# Patient Record
Sex: MALE | Race: WHITE | Employment: FULL TIME | URBAN - METROPOLITAN AREA
[De-identification: names, ages, dates, MRNs, and addresses within clinical notes are randomized per-mention and may not be internally consistent; named-entity substitution may affect disease eponyms.]

---

## 2021-02-10 NOTE — H&P
HPI:     Sera Vasquez is a 37year old male who presents as a consult from Dr Camila Smith office ([facility] - LOV 2013) to discuss vasectomy.   Number of children: 3 kids; going through divorce and definitely does not want to have any more kids    He desires perm acute distress  NEUROLOGIC: nonfocal, alert and oriented  HEAD: normocephalic, atraumatic  EYES: sclera non-icteric  EARS: hearing intact  ORAL CAVITY: mucosa moist  NECK/THYROID: no obvious goiter or masses  LUNGS: nonlabored breathing  ABDOMEN: soft, no

## 2021-02-15 ENCOUNTER — OFFICE VISIT (OUTPATIENT)
Dept: SURGERY | Facility: CLINIC | Age: 36
End: 2021-02-15
Payer: COMMERCIAL

## 2021-02-15 VITALS — SYSTOLIC BLOOD PRESSURE: 104 MMHG | HEART RATE: 69 BPM | TEMPERATURE: 98 F | DIASTOLIC BLOOD PRESSURE: 69 MMHG

## 2021-02-15 DIAGNOSIS — Z30.2 ENCOUNTER FOR STERILIZATION: Primary | ICD-10-CM

## 2021-02-15 PROCEDURE — 3078F DIAST BP <80 MM HG: CPT | Performed by: UROLOGY

## 2021-02-15 PROCEDURE — 99243 OFF/OP CNSLTJ NEW/EST LOW 30: CPT | Performed by: UROLOGY

## 2021-02-15 PROCEDURE — 3074F SYST BP LT 130 MM HG: CPT | Performed by: UROLOGY

## 2021-02-15 RX ORDER — DIAZEPAM 10 MG/1
10 TABLET ORAL SEE ADMIN INSTRUCTIONS
Qty: 2 TABLET | Refills: 0 | Status: SHIPPED | OUTPATIENT
Start: 2021-02-15

## 2021-02-15 RX ORDER — TRAMADOL HYDROCHLORIDE 50 MG/1
50 TABLET ORAL EVERY 6 HOURS PRN
Qty: 15 TABLET | Refills: 0 | Status: SHIPPED | OUTPATIENT
Start: 2021-02-15

## 2021-03-12 NOTE — PROGRESS NOTES
HPI:     Grabiel Jaffe is a 37year old male who presents as a consult from Dr Shireen Selby office ([facility] - [address] 2013) for vasectomy.   Number of children: 3 kids; going through divorce and definitely does not want to have any more kids    He presents today for glue. He was advised not to have unprotected intercourse until we have obtained a negative semen analysis per office protocol explained in post-operative instructions.      The patient tolerated the procedure well and left in satisfactory condition without Pertinent positives and negatives noted in the the HPI.     PHYSICAL EXAM:     GENERAL APPEARANCE: well, developed, well nourished, in no acute distress  NEUROLOGIC: nonfocal, alert and oriented  HEAD: normocephalic, atraumatic  EYES: sclera non-icteric  EA

## 2021-03-18 ENCOUNTER — TELEPHONE (OUTPATIENT)
Dept: SURGERY | Facility: CLINIC | Age: 36
End: 2021-03-18

## 2021-03-18 NOTE — TELEPHONE ENCOUNTER
Patient is scheduled for in office vasectomy CPT [phone] on Friday, March 19th. Per Rob/COLTEN [phone] CPT 62529 rendered in office does not require prior authorization. CPT 54452 is a valid and billable code, covered 100%, no copay, no deductible.   R

## 2021-03-19 ENCOUNTER — PROCEDURE (OUTPATIENT)
Dept: SURGERY | Facility: CLINIC | Age: 36
End: 2021-03-19
Payer: COMMERCIAL

## 2021-03-19 VITALS — SYSTOLIC BLOOD PRESSURE: 120 MMHG | TEMPERATURE: 97 F | HEART RATE: 64 BPM | DIASTOLIC BLOOD PRESSURE: 73 MMHG

## 2021-03-19 DIAGNOSIS — Z30.2 ENCOUNTER FOR STERILIZATION: Primary | ICD-10-CM

## 2021-03-19 PROCEDURE — 3074F SYST BP LT 130 MM HG: CPT | Performed by: UROLOGY

## 2021-03-19 PROCEDURE — 55250 REMOVAL OF SPERM DUCT(S): CPT | Performed by: UROLOGY

## 2021-03-19 PROCEDURE — 3078F DIAST BP <80 MM HG: CPT | Performed by: UROLOGY

## 2021-07-14 ENCOUNTER — LAB ENCOUNTER (OUTPATIENT)
Dept: LAB | Age: 36
End: 2021-07-14
Attending: UROLOGY
Payer: COMMERCIAL

## 2021-07-14 DIAGNOSIS — Z30.2 ENCOUNTER FOR STERILIZATION: ICD-10-CM

## 2021-07-14 PROCEDURE — 89310 SEMEN ANALYSIS W/COUNT: CPT

## 2021-07-21 ENCOUNTER — TELEPHONE (OUTPATIENT)
Dept: SURGERY | Facility: CLINIC | Age: 36
End: 2021-07-21

## 2021-07-21 NOTE — TELEPHONE ENCOUNTER
I called the pt and gave him message from [facility]:        Pt verbalized understanding and all questions were answered.

## 2021-11-09 ENCOUNTER — NURSE ONLY (OUTPATIENT)
Dept: INTERNAL MEDICINE CLINIC | Facility: HOSPITAL | Age: 36
End: 2021-11-09
Attending: EMERGENCY MEDICINE

## 2021-11-09 DIAGNOSIS — Z00.00 WELLNESS EXAMINATION: Primary | ICD-10-CM

## 2021-11-09 PROCEDURE — 86787 VARICELLA-ZOSTER ANTIBODY: CPT

## 2021-11-09 PROCEDURE — 86480 TB TEST CELL IMMUN MEASURE: CPT

## 2021-12-06 ENCOUNTER — TELEPHONE (OUTPATIENT)
Dept: INTERNAL MEDICINE CLINIC | Facility: HOSPITAL | Age: 36
End: 2021-12-06

## 2021-12-06 DIAGNOSIS — Z00.00 ROUTINE GENERAL MEDICAL EXAMINATION AT A HEALTH CARE FACILITY: Primary | ICD-10-CM

## 2022-10-11 ENCOUNTER — IMMUNIZATION (OUTPATIENT)
Dept: LAB | Facility: HOSPITAL | Age: 37
End: 2022-10-11
Attending: PREVENTIVE MEDICINE
Payer: COMMERCIAL

## 2022-10-11 DIAGNOSIS — Z23 NEED FOR VACCINATION: Primary | ICD-10-CM

## 2022-10-11 PROCEDURE — 90471 IMMUNIZATION ADMIN: CPT

## 2024-01-11 NOTE — PROGRESS NOTES
Moshe Burns,  I have reviewed your test results. Your semen analysis shows no sperm. You may now resume intercourse without the use of contraception. Please let me know if you have any questions or concerns. Thanks and take care!     Kulwant Mclean MD
fever at home

## 2024-02-10 ENCOUNTER — HOSPITAL ENCOUNTER (OUTPATIENT)
Facility: HOSPITAL | Age: 39
Setting detail: OBSERVATION
Discharge: HOME | End: 2024-02-13
Attending: EMERGENCY MEDICINE | Admitting: HOSPITALIST
Payer: COMMERCIAL

## 2024-02-10 DIAGNOSIS — R19.7 NAUSEA VOMITING AND DIARRHEA: Primary | ICD-10-CM

## 2024-02-10 DIAGNOSIS — K85.90 ACUTE PANCREATITIS, UNSPECIFIED COMPLICATION STATUS, UNSPECIFIED PANCREATITIS TYPE: ICD-10-CM

## 2024-02-10 DIAGNOSIS — R10.13 EPIGASTRIC PAIN: ICD-10-CM

## 2024-02-10 DIAGNOSIS — D72.829 LEUKOCYTOSIS, UNSPECIFIED TYPE: ICD-10-CM

## 2024-02-10 DIAGNOSIS — R11.2 NAUSEA VOMITING AND DIARRHEA: Primary | ICD-10-CM

## 2024-02-10 DIAGNOSIS — R73.9 HYPERGLYCEMIA: ICD-10-CM

## 2024-02-10 LAB
ALBUMIN SERPL-MCNC: 3.7 G/DL (ref 3.4–5)
ALBUMIN/GLOB SERPL: 1.2 {RATIO} (ref 1–2)
ALP LIVER SERPL-CCNC: 88 U/L
ALT SERPL-CCNC: 29 U/L
ANION GAP SERPL CALC-SCNC: 3 MMOL/L (ref 0–18)
AST SERPL-CCNC: 26 U/L (ref 15–37)
BASOPHILS # BLD AUTO: 0.04 X10(3) UL (ref 0–0.2)
BASOPHILS NFR BLD AUTO: 0.2 %
BILIRUB SERPL-MCNC: 0.5 MG/DL (ref 0.1–2)
BUN BLD-MCNC: 13 MG/DL (ref 9–23)
CALCIUM BLD-MCNC: 8.5 MG/DL (ref 8.5–10.1)
CHLORIDE SERPL-SCNC: 108 MMOL/L (ref 98–112)
CO2 SERPL-SCNC: 29 MMOL/L (ref 21–32)
CREAT BLD-MCNC: 1.13 MG/DL
EGFRCR SERPLBLD CKD-EPI 2021: 86 ML/MIN/1.73M2 (ref 60–?)
EOSINOPHIL # BLD AUTO: 0.2 X10(3) UL (ref 0–0.7)
EOSINOPHIL NFR BLD AUTO: 1.1 %
ERYTHROCYTE [DISTWIDTH] IN BLOOD BY AUTOMATED COUNT: 12.6 %
GLOBULIN PLAS-MCNC: 3.1 G/DL (ref 2.8–4.4)
GLUCOSE BLD-MCNC: 101 MG/DL (ref 70–99)
HCT VFR BLD AUTO: 44.9 %
HGB BLD-MCNC: 15 G/DL
IMM GRANULOCYTES # BLD AUTO: 0.07 X10(3) UL (ref 0–1)
IMM GRANULOCYTES NFR BLD: 0.4 %
LIPASE SERPL-CCNC: 615 U/L (ref 13–75)
LYMPHOCYTES # BLD AUTO: 1.65 X10(3) UL (ref 1–4)
LYMPHOCYTES NFR BLD AUTO: 9 %
MCH RBC QN AUTO: 32.2 PG (ref 26–34)
MCHC RBC AUTO-ENTMCNC: 33.4 G/DL (ref 31–37)
MCV RBC AUTO: 96.4 FL
MONOCYTES # BLD AUTO: 1.42 X10(3) UL (ref 0.1–1)
MONOCYTES NFR BLD AUTO: 7.8 %
NEUTROPHILS # BLD AUTO: 14.89 X10 (3) UL (ref 1.5–7.7)
NEUTROPHILS # BLD AUTO: 14.89 X10(3) UL (ref 1.5–7.7)
NEUTROPHILS NFR BLD AUTO: 81.5 %
OSMOLALITY SERPL CALC.SUM OF ELEC: 290 MOSM/KG (ref 275–295)
PLATELET # BLD AUTO: 304 10(3)UL (ref 150–450)
POTASSIUM SERPL-SCNC: 3.9 MMOL/L (ref 3.5–5.1)
PROT SERPL-MCNC: 6.8 G/DL (ref 6.4–8.2)
RBC # BLD AUTO: 4.66 X10(6)UL
SODIUM SERPL-SCNC: 140 MMOL/L (ref 136–145)
WBC # BLD AUTO: 18.3 X10(3) UL (ref 4–11)

## 2024-02-10 PROCEDURE — 99285 EMERGENCY DEPT VISIT HI MDM: CPT

## 2024-02-10 PROCEDURE — 81001 URINALYSIS AUTO W/SCOPE: CPT | Performed by: EMERGENCY MEDICINE

## 2024-02-10 PROCEDURE — 80053 COMPREHEN METABOLIC PANEL: CPT | Performed by: EMERGENCY MEDICINE

## 2024-02-10 PROCEDURE — 96375 TX/PRO/DX INJ NEW DRUG ADDON: CPT

## 2024-02-10 PROCEDURE — 85025 COMPLETE CBC W/AUTO DIFF WBC: CPT | Performed by: EMERGENCY MEDICINE

## 2024-02-10 PROCEDURE — 96361 HYDRATE IV INFUSION ADD-ON: CPT

## 2024-02-10 PROCEDURE — S0028 INJECTION, FAMOTIDINE, 20 MG: HCPCS | Performed by: EMERGENCY MEDICINE

## 2024-02-10 PROCEDURE — 96374 THER/PROPH/DIAG INJ IV PUSH: CPT

## 2024-02-10 PROCEDURE — 83690 ASSAY OF LIPASE: CPT | Performed by: EMERGENCY MEDICINE

## 2024-02-10 PROCEDURE — 81015 MICROSCOPIC EXAM OF URINE: CPT | Performed by: EMERGENCY MEDICINE

## 2024-02-10 RX ORDER — DIPHENHYDRAMINE HYDROCHLORIDE 50 MG/ML
25 INJECTION INTRAMUSCULAR; INTRAVENOUS ONCE
Status: COMPLETED | OUTPATIENT
Start: 2024-02-10 | End: 2024-02-10

## 2024-02-10 RX ORDER — ONDANSETRON 4 MG/1
4 TABLET, ORALLY DISINTEGRATING ORAL EVERY 8 HOURS PRN
Qty: 10 TABLET | Refills: 0 | Status: SHIPPED | OUTPATIENT
Start: 2024-02-10 | End: 2024-02-20

## 2024-02-10 RX ORDER — LOPERAMIDE HYDROCHLORIDE 2 MG/1
2 TABLET ORAL AS NEEDED
Qty: 20 TABLET | Refills: 0 | Status: SHIPPED | OUTPATIENT
Start: 2024-02-10 | End: 2024-02-13

## 2024-02-10 RX ORDER — SODIUM CHLORIDE 9 MG/ML
125 INJECTION, SOLUTION INTRAVENOUS CONTINUOUS
Status: DISCONTINUED | OUTPATIENT
Start: 2024-02-10 | End: 2024-02-13

## 2024-02-10 RX ORDER — ONDANSETRON 2 MG/ML
4 INJECTION INTRAMUSCULAR; INTRAVENOUS ONCE
Status: DISCONTINUED | OUTPATIENT
Start: 2024-02-10 | End: 2024-02-10

## 2024-02-10 RX ORDER — FAMOTIDINE 10 MG/ML
20 INJECTION, SOLUTION INTRAVENOUS ONCE
Status: COMPLETED | OUTPATIENT
Start: 2024-02-10 | End: 2024-02-10

## 2024-02-10 RX ORDER — METOCLOPRAMIDE HYDROCHLORIDE 5 MG/ML
10 INJECTION INTRAMUSCULAR; INTRAVENOUS ONCE
Status: COMPLETED | OUTPATIENT
Start: 2024-02-10 | End: 2024-02-10

## 2024-02-11 ENCOUNTER — APPOINTMENT (OUTPATIENT)
Dept: CT IMAGING | Age: 39
End: 2024-02-11
Attending: EMERGENCY MEDICINE
Payer: COMMERCIAL

## 2024-02-11 PROBLEM — K85.90 ACUTE PANCREATITIS, UNSPECIFIED COMPLICATION STATUS, UNSPECIFIED PANCREATITIS TYPE: Status: ACTIVE | Noted: 2024-02-11

## 2024-02-11 PROBLEM — R19.7 NAUSEA VOMITING AND DIARRHEA: Status: ACTIVE | Noted: 2024-02-11

## 2024-02-11 PROBLEM — R10.13 EPIGASTRIC PAIN: Status: ACTIVE | Noted: 2024-02-11

## 2024-02-11 PROBLEM — R73.9 HYPERGLYCEMIA: Status: ACTIVE | Noted: 2024-02-11

## 2024-02-11 PROBLEM — R11.2 NAUSEA VOMITING AND DIARRHEA: Status: ACTIVE | Noted: 2024-02-11

## 2024-02-11 PROBLEM — D72.829 LEUKOCYTOSIS, UNSPECIFIED TYPE: Status: ACTIVE | Noted: 2024-02-11

## 2024-02-11 LAB
ALBUMIN SERPL-MCNC: 3.4 G/DL (ref 3.4–5)
ALBUMIN/GLOB SERPL: 1.2 {RATIO} (ref 1–2)
ALP LIVER SERPL-CCNC: 70 U/L
ALT SERPL-CCNC: 21 U/L
ANION GAP SERPL CALC-SCNC: 3 MMOL/L (ref 0–18)
AST SERPL-CCNC: 22 U/L (ref 15–37)
BASOPHILS # BLD AUTO: 0.05 X10(3) UL (ref 0–0.2)
BASOPHILS NFR BLD AUTO: 0.4 %
BILIRUB SERPL-MCNC: 0.6 MG/DL (ref 0.1–2)
BILIRUB UR QL STRIP.AUTO: NEGATIVE
BUN BLD-MCNC: 11 MG/DL (ref 9–23)
C DIFF TOX B STL QL: NEGATIVE
CALCIUM BLD-MCNC: 8.8 MG/DL (ref 8.5–10.1)
CHLORIDE SERPL-SCNC: 111 MMOL/L (ref 98–112)
CHOLEST SERPL-MCNC: 132 MG/DL (ref ?–200)
CLARITY UR REFRACT.AUTO: CLEAR
CO2 SERPL-SCNC: 27 MMOL/L (ref 21–32)
COLOR UR AUTO: YELLOW
CREAT BLD-MCNC: 1.18 MG/DL
EGFRCR SERPLBLD CKD-EPI 2021: 82 ML/MIN/1.73M2 (ref 60–?)
EOSINOPHIL # BLD AUTO: 0.33 X10(3) UL (ref 0–0.7)
EOSINOPHIL NFR BLD AUTO: 2.5 %
ERYTHROCYTE [DISTWIDTH] IN BLOOD BY AUTOMATED COUNT: 12.5 %
GLOBULIN PLAS-MCNC: 2.9 G/DL (ref 2.8–4.4)
GLUCOSE BLD-MCNC: 89 MG/DL (ref 70–99)
GLUCOSE UR STRIP.AUTO-MCNC: NEGATIVE MG/DL
HCT VFR BLD AUTO: 46.1 %
HDLC SERPL-MCNC: 36 MG/DL (ref 40–59)
HGB BLD-MCNC: 15.3 G/DL
IMM GRANULOCYTES # BLD AUTO: 0.06 X10(3) UL (ref 0–1)
IMM GRANULOCYTES NFR BLD: 0.5 %
KETONES UR STRIP.AUTO-MCNC: NEGATIVE MG/DL
LDLC SERPL CALC-MCNC: 79 MG/DL (ref ?–100)
LEUKOCYTE ESTERASE UR QL STRIP.AUTO: NEGATIVE
LYMPHOCYTES # BLD AUTO: 1.91 X10(3) UL (ref 1–4)
LYMPHOCYTES NFR BLD AUTO: 14.7 %
MCH RBC QN AUTO: 32.3 PG (ref 26–34)
MCHC RBC AUTO-ENTMCNC: 33.2 G/DL (ref 31–37)
MCV RBC AUTO: 97.3 FL
MONOCYTES # BLD AUTO: 1.28 X10(3) UL (ref 0.1–1)
MONOCYTES NFR BLD AUTO: 9.9 %
NEUTROPHILS # BLD AUTO: 9.35 X10 (3) UL (ref 1.5–7.7)
NEUTROPHILS # BLD AUTO: 9.35 X10(3) UL (ref 1.5–7.7)
NEUTROPHILS NFR BLD AUTO: 72 %
NITRITE UR QL STRIP.AUTO: NEGATIVE
NONHDLC SERPL-MCNC: 96 MG/DL (ref ?–130)
OSMOLALITY SERPL CALC.SUM OF ELEC: 291 MOSM/KG (ref 275–295)
PH UR STRIP.AUTO: 5.5 [PH] (ref 5–8)
PLATELET # BLD AUTO: 270 10(3)UL (ref 150–450)
POTASSIUM SERPL-SCNC: 3.7 MMOL/L (ref 3.5–5.1)
PROT SERPL-MCNC: 6.3 G/DL (ref 6.4–8.2)
PROT UR STRIP.AUTO-MCNC: NEGATIVE MG/DL
RBC # BLD AUTO: 4.74 X10(6)UL
SODIUM SERPL-SCNC: 141 MMOL/L (ref 136–145)
SP GR UR STRIP.AUTO: >=1.03 (ref 1–1.03)
TRIGL SERPL-MCNC: 91 MG/DL (ref 30–149)
UROBILINOGEN UR STRIP.AUTO-MCNC: 0.2 MG/DL
VLDLC SERPL CALC-MCNC: 14 MG/DL (ref 0–30)
WBC # BLD AUTO: 13 X10(3) UL (ref 4–11)

## 2024-02-11 PROCEDURE — 85025 COMPLETE CBC W/AUTO DIFF WBC: CPT | Performed by: HOSPITALIST

## 2024-02-11 PROCEDURE — 87493 C DIFF AMPLIFIED PROBE: CPT | Performed by: HOSPITALIST

## 2024-02-11 PROCEDURE — 87507 IADNA-DNA/RNA PROBE TQ 12-25: CPT | Performed by: HOSPITALIST

## 2024-02-11 PROCEDURE — 80053 COMPREHEN METABOLIC PANEL: CPT | Performed by: HOSPITALIST

## 2024-02-11 PROCEDURE — 74177 CT ABD & PELVIS W/CONTRAST: CPT | Performed by: EMERGENCY MEDICINE

## 2024-02-11 PROCEDURE — 80061 LIPID PANEL: CPT | Performed by: HOSPITALIST

## 2024-02-11 RX ORDER — ONDANSETRON 2 MG/ML
4 INJECTION INTRAMUSCULAR; INTRAVENOUS EVERY 6 HOURS PRN
Status: DISCONTINUED | OUTPATIENT
Start: 2024-02-11 | End: 2024-02-13

## 2024-02-11 RX ORDER — SODIUM CHLORIDE, SODIUM LACTATE, POTASSIUM CHLORIDE, CALCIUM CHLORIDE 600; 310; 30; 20 MG/100ML; MG/100ML; MG/100ML; MG/100ML
INJECTION, SOLUTION INTRAVENOUS CONTINUOUS
Status: DISCONTINUED | OUTPATIENT
Start: 2024-02-11 | End: 2024-02-13

## 2024-02-11 RX ORDER — MORPHINE SULFATE 2 MG/ML
1 INJECTION, SOLUTION INTRAMUSCULAR; INTRAVENOUS EVERY 2 HOUR PRN
Status: DISCONTINUED | OUTPATIENT
Start: 2024-02-11 | End: 2024-02-13

## 2024-02-11 RX ORDER — MORPHINE SULFATE 4 MG/ML
4 INJECTION, SOLUTION INTRAMUSCULAR; INTRAVENOUS EVERY 2 HOUR PRN
Status: DISCONTINUED | OUTPATIENT
Start: 2024-02-11 | End: 2024-02-13

## 2024-02-11 RX ORDER — MORPHINE SULFATE 2 MG/ML
2 INJECTION, SOLUTION INTRAMUSCULAR; INTRAVENOUS EVERY 2 HOUR PRN
Status: DISCONTINUED | OUTPATIENT
Start: 2024-02-11 | End: 2024-02-13

## 2024-02-11 RX ORDER — ENOXAPARIN SODIUM 100 MG/ML
40 INJECTION SUBCUTANEOUS DAILY
Status: DISCONTINUED | OUTPATIENT
Start: 2024-02-11 | End: 2024-02-13

## 2024-02-11 RX ORDER — PROCHLORPERAZINE EDISYLATE 5 MG/ML
5 INJECTION INTRAMUSCULAR; INTRAVENOUS EVERY 8 HOURS PRN
Status: DISCONTINUED | OUTPATIENT
Start: 2024-02-11 | End: 2024-02-13

## 2024-02-11 NOTE — ED QUICK NOTES
Orders for admission, patient is aware of plan and ready to go upstairs. Any questions, please call ED RN Nel at extension [phone].     Patient Covid vaccination status: Fully vaccinated     COVID Test Ordered in ED: None    COVID Suspicion at Admission: N/A    Running Infusions:    sodium chloride Stopped (02/11/24 0148)    None    Mental Status/LOC at time of transport: A&OX3    Other pertinent information: Patient arriving by private car, ok per MD.  CIWA score: N/A   NIH score:  N/A

## 2024-02-11 NOTE — H&P
[facility] Hospitalist H&P       CC:   Chief Complaint   Patient presents with    Nausea/Vomiting/Diarrhea        PCP: Jarod Lo MD    History of Present Illness:    Patient is a relatively healthy 38-year-old male with no significant past medical history basically presented with abdominal knee pain x 1 week.  Patient states that for about 1 week he had midepigastric pain and he was doubled over with cramping as well as profuse diarrhea at home.  He also had some nausea especially with food but no vomiting.  No chest pains no shortness of breath review of system otherwise is complete negative.  Currently patient has a stomach ache but has no nausea no vomiting eager to eat something does not drink any alcohol.  Patient states that he probably had 1 drink in 6 months-no other symptoms whatsoever       in the emergency room his vitals were stable labs remarkable for lipase in the 600s, rest the workup was remarkable for leukocytosis of 18.3 UA was negative for C. difficile is negative        Patient was kept n.p.o. and admitted for acute pancreatitis CT abdomen pelvis was done that showed prominent fluid-filled small bowel loops with mild wall thickening concerning for some enteritis  PMH  History reviewed. No pertinent past medical history.     PSH  Past Surgical History:   Procedure Laterality Date    OTHER SURGICAL HISTORY  12 y/o    nasal fracture repair        ALL:  Allergies   Allergen Reactions    Sulfa Antibiotics RASH        Home Medications:  Outpatient Medications Marked as Taking for the 2/10/24 encounter (Hospital Encounter)   Medication Sig Dispense Refill    ondansetron 4 MG Oral Tablet Dispersible Take 1 tablet (4 mg total) by mouth every 8 (eight) hours as needed for Nausea (vomiting). 10 tablet 0    Loperamide HCl 2 MG Oral Tab Take 1 tablet (2 mg total) by mouth as needed for Diarrhea. Max of 8mg in 24 hours 20 tablet 0         Soc Hx  Social History     Tobacco Use    Smoking status: Never     Smokeless tobacco: Never   Substance Use Topics    Alcohol use: Yes     Comment: socially        Fam Hx  Family History   Problem Relation Age of Onset    Cancer Maternal Grandmother 69        colon    Heart Attack Maternal Grandfather        Review of Systems  General: Denies unintentional weight loss, fevers, or chills negative except for above  HEENT: Denies vision loss or double vision, denies hearing loss  Cardiovascular: Denies chest pain, palpitations, peripheral edema  Pulmonary: Denies cough, shortness of breath, or wheezing  Gastrointestinal: Denies abdominal pain, melena, or hematochezia  Genitourinary: Denies urinary frequency, urgency, and dysuria  Neurologic: Denies numbness, headaches, focal weakness  Skin: Denies rashes, sores  Endocrine: Denies heat or cold intolerance, denies polydipsia  Hematologic: Denies abnormal bleeding or bruising     OBJECTIVE:  /65 (BP Location: Left arm)   Pulse 75   Temp 98.3 °F (36.8 °C) (Oral)   Resp 16   Ht 6' 6\" (1.981 m)   Wt 259 lb (117.5 kg)   SpO2 98%   BMI 29.93 kg/m²     BP Readings from Last 3 Encounters:   02/11/24 108/65   03/19/21 120/73   02/15/21 104/69     Wt Readings from Last 3 Encounters:   02/11/24 259 lb (117.5 kg)   09/13/13 250 lb (113.4 kg)   08/29/13 247 lb (112 kg)       Wt Readings from Last 6 Encounters:   02/11/24 259 lb (117.5 kg)   09/13/13 250 lb (113.4 kg)   08/29/13 247 lb (112 kg)   08/15/13 253 lb (114.8 kg)   08/09/13 248 lb (112.5 kg)   08/02/13 248 lb (112.5 kg)     Gen: No acute distress, alert and oriented x 3  Pulm: Lungs clear bilaterally, good inspiratory effort   CV:  nL S1/S2  Abd: soft, NT/ND, no hepatomegaly, +BS mild tenderness in the midepigastric region-  MSK: moving all extremities, no edema  Neuro: no focal deficits  Skin: no rashes/lesions  Psych: normal mood/affect          Diagnostic Data:    CBC/Chem  Recent Labs   Lab 02/10/24  2257 02/11/24  1108   WBC 18.3* 13.0*   HGB 15.0 15.3   MCV 96.4 97.3    .0 270.0       Recent Labs   Lab 02/10/24  2257 02/11/24  1108    141   K 3.9 3.7    111   CO2 29.0 27.0   BUN 13 11   CREATSERUM 1.13 1.18   * 89   CA 8.5 8.8       Recent Labs   Lab 02/10/24  2257 02/11/24  1108   ALT 29 21   AST 26 22   ALB 3.7 3.4       No results for input(s): \"TROP\" in the last 168 hours.        Radiology: CT ABDOMEN+PELVIS(CONTRAST ONLY)(CPT=74177)    Result Date: 2/11/2024  PROCEDURE:  CT ABDOMEN+PELVIS (CONTRAST ONLY) (CPT=74177)  COMPARISON:  None.  INDICATIONS:  nausea and vomiting for approximately a week.  denies fever  TECHNIQUE:  CT scanning was performed from the dome of the diaphragm to the pubic symphysis with non-ionic intravenous contrast material. Post contrast coronal MPR imaging was performed.  Dose reduction techniques were used. Dose information is transmitted to the ACR (American College of Radiology) NRDR (National Radiology Data Registry) which includes the Dose Index Registry.  PATIENT STATED HISTORY:(As transcribed by Technologist)  nausea and vomiting for approximately a week.  denies fever   CONTRAST USED:  100cc of Isovue 370  FINDINGS:  LUNG BASE:  Clear. LIVER:  Homogeneous enhancement. BILIARY:  No biliary ductal dilatation. PANCREAS:  Homogeneous enhancement. SPLEEN:  Normal caliber. KIDNEYS:  No hydronephrosis or focal renal mass. ADRENALS:  Normal. AORTA/VASCULAR:  No aneurysm. RETROPERITONEUM:  No enlarged adenopathy. BOWEL/MESENTERY:  There is a large amount of stool throughout the colon consistent with constipation.  There are fluid-filled small bowel loops demonstrating mild wall thickening throughout the abdomen and anterior pelvis.  No evidence of mechanical small bowel obstruction.  Findings nonspecific although may be seen with enteritis. ABDOMINAL WALL:  Small fat containing umbilical hernia.. PELVIC ORGANS:  Normal for age. 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  Mild degenerative changes in the lower lumbar facets.              CONCLUSION:  There is prominent fluid-filled small bowel loops with mild wall thickening in the left upper quadrant, central abdomen and anterior pelvis.  This is nonspecific although may be seen with enteritis.  No evidence of mechanical small bowel obstruction.  Large amount of stool throughout the colon consistent with constipation.   LOCATION:  [id number]   Dictated by (CST): Mirna Reed MD on 2/11/2024 at 7:54 AM     Finalized by (CST): Mirna Reed MD on 2/11/2024 at 7:57 AM              ASSESSMENT / PLAN:       Patient is a relatively healthy 38-year-old male with no significant past medical history basically presented with abdominal pain x 1 week.      # Acute pancreatitis unclear cause  -Elevated lipase, midepigastric pain with nausea vomiting  -CT abdomen pelvis without any evidence of pancreatitis however small fluid-filled bowels  -Getting a GI stool panel, LFTs are within normal limits, will get an right  upper quadrant ultrasound  -Lipid panel to rule out triglyceride induced pancreatitis  -Continue to monitor CBC, leukocytosis been trending down  -Clear liquid diet  -IV fluids, pain control nausea control    Prophy:  DVT: Lovenox  Deconditioning prevention: PT OT    Dispo: admit to [facility] with telemetry    Outpatient records reviewed confirming patient's medical history and medications.     Further recommendations pending patient's clinical course.  DM hospitalist to continue to follow patient while in house    Time spent: greater than 95 minutes spent in d/w pt/family, coordination of care, and d/w staff.     Pepito ac MD   Internal Medicine  [facility] Hospitalist  Pager: [phone]

## 2024-02-11 NOTE — ED PROVIDER NOTES
Patient Seen in: [facility] [place]      History     Chief Complaint   Patient presents with    Nausea/Vomiting/Diarrhea     Stated Complaint: nausea and vomiting for approximately a week.  denies fever    Subjective:   Patient is 38-year-old male presents emergency room for evaluation of epigastric pain.  Patient reports for the last week he has had nausea with forced vomiting no fevers.  Patient reports no tobacco use or alcohol use.  Patient reports he went to urgent care was given prescription for Zofran and Bentyl with no improvement.  Patient has no tenderness of patient on exam.    The history is provided by the patient.           Objective:   History reviewed. No pertinent past medical history.           Past Surgical History:   Procedure Laterality Date    OTHER SURGICAL HISTORY  14 y/o    nasal fracture repair                Social History     Socioeconomic History    Marital status: Single   Tobacco Use    Smoking status: Never    Smokeless tobacco: Never   Vaping Use    Vaping Use: Never used   Substance and Sexual Activity    Alcohol use: Yes     Comment: socially    Drug use: No              Review of Systems   Constitutional:  Negative for fever.   Gastrointestinal:  Positive for abdominal pain, nausea and vomiting.       Positive for stated complaint: nausea and vomiting for approximately a week.  denies fever  Other systems are as noted in HPI.  Constitutional and vital signs reviewed.      All other systems reviewed and negative except as noted above.    Physical Exam     ED Triage Vitals [02/10/24 2243]   /85   Pulse 89   Resp 16   Temp 98.3 °F (36.8 °C)   Temp src Temporal   SpO2 100 %   O2 Device None (Room air)       Current:/78   Pulse 83   Temp 98.2 °F (36.8 °C) (Oral)   Resp 15   Ht 198.1 cm (6' 6\")   Wt 113.4 kg   SpO2 98%   BMI 28.89 kg/m²         Physical Exam  Vitals and nursing note reviewed.   Constitutional:       General: He is not in acute  distress.     Appearance: He is well-developed and normal weight. He is not toxic-appearing.   HENT:      Head: Normocephalic and atraumatic.   Eyes:      Extraocular Movements: Extraocular movements intact.      Pupils: Pupils are equal, round, and reactive to light.   Cardiovascular:      Rate and Rhythm: Normal rate and regular rhythm.      Heart sounds: Normal heart sounds.   Pulmonary:      Effort: Pulmonary effort is normal.      Breath sounds: Normal breath sounds.   Abdominal:      General: Bowel sounds are normal. There is no distension.      Palpations: Abdomen is soft.      Tenderness: There is no abdominal tenderness. There is no guarding or rebound.   Skin:     General: Skin is warm.      Capillary Refill: Capillary refill takes less than 2 seconds.   Neurological:      General: No focal deficit present.      Mental Status: He is alert and oriented to person, place, and time.   Psychiatric:         Mood and Affect: Mood normal.         Behavior: Behavior normal.               ED Course     Labs Reviewed   COMP METABOLIC PANEL (14) - Abnormal; Notable for the following components:       Result Value    Glucose 101 (*)     All other components within normal limits   LIPASE - Abnormal; Notable for the following components:    Lipase 615 (*)     All other components within normal limits   CBC W/ DIFFERENTIAL - Abnormal; Notable for the following components:    WBC 18.3 (*)     Neutrophil Absolute Prelim 14.89 (*)     Neutrophil Absolute 14.89 (*)     Monocyte Absolute 1.42 (*)     All other components within normal limits   CBC WITH DIFFERENTIAL WITH PLATELET    Narrative:     The following orders were created for panel order CBC With Differential With Platelet.  Procedure                               Abnormality         Status                     ---------                               -----------         ------                     CBC W/ DIFFERENTIAL[[id number]]          Abnormal            Final result                  Please view results for these tests on the individual orders.   URINALYSIS, ROUTINE             CT scan shows no priors the stomach is contracted and there is a few prominent fluid-filled segments of small bowel in the abdomen and pelvis without wall thickening or discrete transition point to suggest mechanical obstruction.  This is a nonspecific pattern which may represent mild enteritis.  The liver gallbladder spleen and pancreas adrenal glands and kidneys are normal.  The appendix is surgically absent.  The colon is stool-filled and prominent suggesting constipation.  The colon is wall thickening.  Bladder and prostate appear normal.  No ascites unremarkable lung bases.  Chronic pars defect of L5 on the left.         MDM      Social -negative tobacco, negative etoh, negative drugs  Family History-noncontributory  Past Medical History-no significant past medical history of    Differential diagnosis before testing included gastritis, pancreatitis, bowel obstruction    Co-morbidities that add to the complexity of management include: None    Testing ordered during this visit included in labs, CT scan of the abdomen    Radiographic images  I personally reviewed the radiographs and my individual interpretation shows no acute process  I also reviewed the official reports that showed No results found.      External chart review showed review of care everywhere in epic system shows no related comorbidities to current presentation    History obtained by an independent source included from patient    Discussion of management with patient, hospitalist    Social determinants of health that affect care include not applicable      Medications Provided: Zofran, Pepcid, IV fluids    Course of Events during Emergency Room Visit include 38-year-old male who presents emergency room with 1 week of epigastric pain.  Patient reports he has been causing himself to vomit to help with symptoms however does not relieve his  pain.  Patient reports no tobacco or alcohol use.  He is found to have an elevated lipase level keep n.p.o. IV fluids and plan for admission awaiting CT results and will speak with the duly hospitalist.          Disposition:    Admission  I have discussed with the patient the results of test, differential diagnosis, and treatment plan. They expressed clear understanding of these instructions and agrees to the plan provided.                                      Medical Decision Making      Disposition and Plan     Clinical Impression:  1. Nausea vomiting and diarrhea    2. Epigastric pain    3. Acute pancreatitis, unspecified complication status, unspecified pancreatitis type         Disposition:  There is no disposition on file for this visit.  There is no disposition time on file for this visit.    Follow-up:  Jarod Lo MD  [address] [place] [address] [phone]    Schedule an appointment as soon as possible for a visit            Medications Prescribed:  Current Discharge Medication List        START taking these medications    Details   ondansetron 4 MG Oral Tablet Dispersible Take 1 tablet (4 mg total) by mouth every 8 (eight) hours as needed for Nausea (vomiting).  Qty: 10 tablet, Refills: 0      Loperamide HCl 2 MG Oral Tab Take 1 tablet (2 mg total) by mouth as needed for Diarrhea. Max of 8mg in 24 hours  Qty: 20 tablet, Refills: 0

## 2024-02-11 NOTE — PLAN OF CARE
Alert and oriented x 4. Vitals stable on room air. Denies pain or nausea at this time. Voiding without difficulty. Last BM 02/11, abdomen soft, passing gas, bowel sounds present. On clear liquid diet. IV fluids per orders.

## 2024-02-11 NOTE — ED QUICK NOTES
DEVEN Coates aware of patient transport via private car and the need to resume IVF once patient arrives. IVF stopped as ordered per MD and IV wrapped/secured for transport. Patient remains alert and oriented,up with steady gait.

## 2024-02-12 ENCOUNTER — APPOINTMENT (OUTPATIENT)
Dept: ULTRASOUND IMAGING | Facility: HOSPITAL | Age: 39
End: 2024-02-12
Attending: HOSPITALIST
Payer: COMMERCIAL

## 2024-02-12 LAB
ADENOVIRUS F 40/41 PCR: NEGATIVE
ALBUMIN SERPL-MCNC: 3.4 G/DL (ref 3.4–5)
ALBUMIN/GLOB SERPL: 1.2 {RATIO} (ref 1–2)
ALP LIVER SERPL-CCNC: 66 U/L
ALT SERPL-CCNC: 20 U/L
ANION GAP SERPL CALC-SCNC: 0 MMOL/L (ref 0–18)
AST SERPL-CCNC: 18 U/L (ref 15–37)
ASTROVIRUS PCR: NEGATIVE
BILIRUB SERPL-MCNC: 0.5 MG/DL (ref 0.1–2)
BUN BLD-MCNC: 7 MG/DL (ref 9–23)
C CAYETANENSIS DNA SPEC QL NAA+PROBE: NEGATIVE
CALCIUM BLD-MCNC: 8.6 MG/DL (ref 8.5–10.1)
CAMPY SP DNA.DIARRHEA STL QL NAA+PROBE: POSITIVE
CHLORIDE SERPL-SCNC: 111 MMOL/L (ref 98–112)
CO2 SERPL-SCNC: 29 MMOL/L (ref 21–32)
CREAT BLD-MCNC: 1.03 MG/DL
CRYPTOSP DNA SPEC QL NAA+PROBE: NEGATIVE
EAEC PAA PLAS AGGR+AATA ST NAA+NON-PRB: NEGATIVE
EC STX1+STX2 + H7 FLIC SPEC NAA+PROBE: NEGATIVE
EGFRCR SERPLBLD CKD-EPI 2021: 97 ML/MIN/1.73M2 (ref 60–?)
ENTAMOEBA HISTOLYTICA PCR: NEGATIVE
EPEC EAE GENE STL QL NAA+NON-PROBE: NEGATIVE
ERYTHROCYTE [DISTWIDTH] IN BLOOD BY AUTOMATED COUNT: 12.4 %
ETEC LTA+ST1A+ST1B TOX ST NAA+NON-PROBE: NEGATIVE
GIARDIA LAMBLIA PCR: POSITIVE
GLOBULIN PLAS-MCNC: 2.9 G/DL (ref 2.8–4.4)
GLUCOSE BLD-MCNC: 86 MG/DL (ref 70–99)
HCT VFR BLD AUTO: 44 %
HGB BLD-MCNC: 14.6 G/DL
LIPASE SERPL-CCNC: 26 U/L (ref ?–300)
MCH RBC QN AUTO: 32.4 PG (ref 26–34)
MCHC RBC AUTO-ENTMCNC: 33.2 G/DL (ref 31–37)
MCV RBC AUTO: 97.8 FL
NOROVIRUS GI/GII PCR: NEGATIVE
OSMOLALITY SERPL CALC.SUM OF ELEC: 287 MOSM/KG (ref 275–295)
P SHIGELLOIDES DNA STL QL NAA+PROBE: NEGATIVE
PLATELET # BLD AUTO: 299 10(3)UL (ref 150–450)
POTASSIUM SERPL-SCNC: 3.7 MMOL/L (ref 3.5–5.1)
PROT SERPL-MCNC: 6.3 G/DL (ref 6.4–8.2)
RBC # BLD AUTO: 4.5 X10(6)UL
ROTAVIRUS A PCR: NEGATIVE
SALMONELLA DNA SPEC QL NAA+PROBE: NEGATIVE
SAPOVIRUS PCR: NEGATIVE
SHIGELLA SP+EIEC IPAH ST NAA+NON-PROBE: NEGATIVE
SODIUM SERPL-SCNC: 140 MMOL/L (ref 136–145)
V CHOLERAE DNA SPEC QL NAA+PROBE: NEGATIVE
VIBRIO DNA SPEC NAA+PROBE: NEGATIVE
WBC # BLD AUTO: 10.5 X10(3) UL (ref 4–11)
YERSINIA DNA SPEC NAA+PROBE: NEGATIVE

## 2024-02-12 PROCEDURE — 76700 US EXAM ABDOM COMPLETE: CPT | Performed by: HOSPITALIST

## 2024-02-12 PROCEDURE — 87507 IADNA-DNA/RNA PROBE TQ 12-25: CPT | Performed by: HOSPITALIST

## 2024-02-12 PROCEDURE — 85027 COMPLETE CBC AUTOMATED: CPT | Performed by: HOSPITALIST

## 2024-02-12 PROCEDURE — 80053 COMPREHEN METABOLIC PANEL: CPT | Performed by: HOSPITALIST

## 2024-02-12 PROCEDURE — 83690 ASSAY OF LIPASE: CPT | Performed by: HOSPITALIST

## 2024-02-12 RX ORDER — AZITHROMYCIN 250 MG/1
500 TABLET, FILM COATED ORAL
Status: DISCONTINUED | OUTPATIENT
Start: 2024-02-12 | End: 2024-02-13

## 2024-02-12 RX ORDER — METRONIDAZOLE 500 MG/1
500 TABLET ORAL EVERY 8 HOURS SCHEDULED
Status: DISCONTINUED | OUTPATIENT
Start: 2024-02-12 | End: 2024-02-13

## 2024-02-12 NOTE — PLAN OF CARE
A/o x4. Ra/. CLD NPO at MN for US abdomen. Denies n/v.  LBM 2/11 need to recollect stool sample for GI panel. Denies pain . IVF infusing. Up independently. POC updated with pt and spouse at bedside. All safety measures in place. Call light within reach instructed pt to call for help or assistance.

## 2024-02-12 NOTE — PROGRESS NOTES
[facility] Hospitalist Progress Note     Grant Hoyos Patient Status:  Observation    1985 MRN [id number]   Location [facility] 3SW-A Attending Allan Smith, DO   Hosp Day # 0 PCP Jarod Lo MD     Follow Up:  The primary encounter diagnosis was Nausea vomiting and diarrhea. Diagnoses of Epigastric pain, Acute pancreatitis, unspecified complication status, unspecified pancreatitis type, Hyperglycemia, and Leukocytosis, unspecified type were also pertinent to this visit.    Subjective:     Patient seen and examined.  States he is feeling better, denies abd pain, no further diarrhea.  No fevers.      Objective:    Review of Systems:   10 point ROS completed and was negative, except for pertinent positive and negatives stated in subjective.    Vital signs:  Temp:  [98 °F (36.7 °C)-98.3 °F (36.8 °C)] 98.3 °F (36.8 °C)  Pulse:  [55-77] 73  Resp:  [16-20] 19  BP: (122-132)/(71-77) 131/76  SpO2:  [97 %-100 %] 98 %    Physical Exam:    General: No acute distress.   HEENT:  EOMI, PERRLA, OP clear  Respiratory: Clear to auscultation bilaterally. No wheezes. No rhonchi.  Cardiovascular: S1, S2. Regular rate and rhythm. No murmurs.  Abdomen: Soft, nontender, nondistended.  Positive bowel sounds. No rebound or guarding.  Extremities: No edema.  Neuro:  Grossly non focal, no motor deficits.        Diagnostic Data:    Labs:  Recent Labs   Lab 02/10/24  2257 02/11/24  1108 24  0514   WBC 18.3* 13.0* 10.5   HGB 15.0 15.3 14.6   MCV 96.4 97.3 97.8   .0 270.0 299.0       Recent Labs   Lab 02/10/24  2257 02/11/24  1108 24  0514   * 89 86   BUN 13 11 7*   CREATSERUM 1.13 1.18 1.03   CA 8.5 8.8 8.6   ALB 3.7 3.4 3.4    141 140   K 3.9 3.7 3.7    111 111   CO2 29.0 27.0 29.0   ALKPHO 88 70 66   AST 26 22 18   ALT 29 21 20   BILT 0.5 0.6 0.5   TP 6.8 6.3* 6.3*       Estimated Creatinine Clearance: 128.2 mL/min (based on SCr of 1.03 mg/dL).    No  results for input(s): \"PTP\", \"INR\" in the last 168 hours.         COVID-19 Lab Results    COVID-19  No results found for: \"COVID19\"    Pro-Calcitonin  No results for input(s): \"PCT\" in the last 168 hours.    Cardiac  No results for input(s): \"TROP\", \"PBNP\" in the last 168 hours.    Creatinine Kinase  No results for input(s): \"CK\" in the last 168 hours.    Inflammatory Markers  No results for input(s): \"CRP\", \"JOSUE\", \"LDH\", \"DDIMER\" in the last 168 hours.    Imaging: Imaging data reviewed in Epic.    Medications:    enoxaparin  40 mg Subcutaneous Daily       Assessment & Plan:      38-year-old male with no significant past medical history basically presented with abdominal pain x 1 week.     # Elevated lipase level  # Campylobacter and giardia gastroenteritis   - unlikely due to acute pancreatitis, more likely campylobacter and giardia gastroenteritis   - advance diet to FLD  - start azithromycin 500 mg po daily x 3 days  - start flagyl 500 mg po TID here, then tinidazole on discharge     Plan of care: inpt care.      Plan of care discussed with patient or family at bedside.    Allan Smith, DO    Supplementary Documentation:     Quality:  DVT Prophylaxis: lov subcutaneous   CODE status: FULL  Haji: no  Central line: no  If COVID testing is negative, may discontinue isolation: yes     Estimated date of discharge: likely tomorrow   Discharge is dependent on: clinical course   At this point Mr. Hoyos is expected to be discharge to: home    Plan of care discussed with pt

## 2024-02-12 NOTE — PLAN OF CARE
Alert and oriented x 4. Vitals stable on room. Denies pain, nausea, or vomiting. BM today. Clear liquids. Voiding without difficulty. Encouraged ambulation in hallway. Plan of care discussed with patient.

## 2024-02-13 VITALS
HEIGHT: 78 IN | WEIGHT: 259 LBS | TEMPERATURE: 98 F | BODY MASS INDEX: 29.97 KG/M2 | DIASTOLIC BLOOD PRESSURE: 66 MMHG | SYSTOLIC BLOOD PRESSURE: 110 MMHG | HEART RATE: 66 BPM | RESPIRATION RATE: 18 BRPM | OXYGEN SATURATION: 94 %

## 2024-02-13 RX ORDER — TINIDAZOLE 500 MG/1
2 TABLET ORAL ONCE
Qty: 4 TABLET | Refills: 0 | Status: SHIPPED | OUTPATIENT
Start: 2024-02-13 | End: 2024-02-13

## 2024-02-13 RX ORDER — AZITHROMYCIN 250 MG/1
500 TABLET, FILM COATED ORAL ONCE
Qty: 2 TABLET | Refills: 0 | Status: SHIPPED | OUTPATIENT
Start: 2024-02-14 | End: 2024-02-14

## 2024-02-13 NOTE — PLAN OF CARE
Patient alert and oriented x4. VSS on RA. Denies pain. Patient reports passing gas. Emesis this AM after drinking coffee. Refusing SCDs, refusing lovenox, ankle pumps encouraged. Up ad kaushal. IVF running per order.     Plan: possible discharge this afternoon if tolerating lunch, PO abx

## 2024-02-13 NOTE — DISCHARGE SUMMARY
[facility] Internal Medicine Hospitalist Discharge Summary     Patient ID:  Grant Hoyos  38 year old  5/18/1985    Admit date: 2/10/2024    Discharge date and time:  2/13    Attending Physician: Allan Smith DO     Primary Care Physician: aJrod Lo MD     Discharge Diagnoses:   Campylobacter enteritis   Giardia enteritis   Elevated lipase level     Please note that only [facility] DMG and EMG patients enrolled in the Medicare ACO, [facility] ACO and [facility] HMOs will be handled by the [facility] Care Management team.  For all other patients, please follow usual protocol for discharge care transition.    Discharge Condition: stable    Disposition:  Home    Important Follow up:  - PCP within 1 week  - Consults: None    Follow Up Items:  None      Hospital Course:      38-year-old male with no significant past medical history basically presented with abdominal pain x 1 week.     # Elevated lipase level  # Campylobacter and giardia gastroenteritis   - unlikely due to acute pancreatitis, more likely campylobacter and giardia gastroenteritis   - advance diet to reg diet  - start azithromycin 500 mg po daily x 3 days, finish course tomorrow   - start flagyl 500 mg po TID here, then tinidazole on discharge     Stable for discharge home.     Consults: IP CONSULT TO HOSPITALIST    Operative Procedures:  None      Patient instructions:      I as the attending physician reconciled the current and discharge medications on day of discharge.     Current Discharge Medication List        START taking these medications    Details   Tinidazole 500 MG Oral Tab Take 4 tablets (2,000 mg total) by mouth once for 1 dose.      azithromycin 250 MG Oral Tab Take 2 tablets (500 mg total) by mouth once for 1 dose.      ondansetron 4 MG Oral Tablet Dispersible Take 1 tablet (4 mg total) by mouth every 8 (eight) hours as needed for Nausea (vomiting).      Loperamide HCl 2 MG Oral Tab Take 1 tablet  (2 mg total) by mouth as needed for Diarrhea. Max of 8mg in 24 hours           STOP taking these medications       diazepam (VALIUM) 10 MG Oral Tab        traMADol HCl 50 MG Oral Tab              Activity: activity as tolerated  Diet: low fat, low cholesterol diet  Wound Care: as directed  Code Status: No Order      Exam on day of discharge:     Vitals:    02/13/24 0520   BP: 110/66   Pulse: 66   Resp: 18   Temp: 98.2 °F (36.8 °C)       General: No acute distress.   HEENT:  EOMI, PERRLA, OP clear  Respiratory: Clear to auscultation bilaterally. No wheezes. No rhonchi.  Cardiovascular: S1, S2. Regular rate and rhythm. No murmurs.  Abdomen: Soft, nontender, nondistended.  Positive bowel sounds. No rebound or guarding.  Extremities: No edema.  Neuro:  Grossly non focal, no motor deficits.        Total time coordinating care for discharge: Greater than 30 minutes    Allan Smith DO  [facility] Hospitalist

## 2024-02-13 NOTE — DISCHARGE INSTRUCTIONS
Food Poisoning or Viral Gastroenteritis (Adult)  You have a stomach illness that is likely either food poisoning or viral gastroenteritis.   Food poisoning is illness that is passed along in food and affects the stomach and intestinal tract. It usually occurs from 1 to 24 hours after eating food that has spoiled. When it happens within a few hours of eating, it's often caused by toxins from bacteria in food that has not been cooked or refrigerated properly.   Viral gastroenteritis is an illness from a virus that also affects the stomach and intestinal tract. Many people call it the “stomach flu,” but it has nothing to do with influenza. In fact, it can happen from food poisoning, but it can also happen when germs are passed from person-to-person or contaminated surface (toothbrush, cutting board, toilet) to a person.   Either illness can cause these symptoms:  Belly (abdominal) pain and cramping  Nausea  Vomiting  Diarrhea  Fever and chills  Loss of bowel control  The symptoms of food poisoning usually last 1 to 2 days. The symptoms of viral gastroenteritis can sometimes last up to 7 days but usually end sooner.   Antibiotics are not effective for viral gastroenteritis . But they may be prescribed for food poisoning that was caused by bacteria or parasites.   Other causes of gastroenteritis include bacteria and parasites which are not discussed here.   Home care  Follow all instructions given by your healthcare provider. Rest at home for the next 24 hours, or until you feel better. Don't have any caffeine, tobacco, or alcohol. These can make diarrhea, cramping, and pain worse.   If taking medicines:  Over-the-counter diarrhea or nausea medicines are generally OK unless you have bleeding, fever, or severe abdominal pain.  You may use acetaminophen or nonsteroidal anti-inflammatory drugs (NSAID) such as ibuprofen or naproxen to reduce pain and fever. Don’t use these if you have chronic liver or kidney disease, or  ever had a stomach ulcer or gastrointestinal bleeding. Talk with your healthcare provider first. Don't use NSAIDs if you are already taking one for another condition (such as arthritis) or are on daily aspirin therapy (such as for heart disease or after a stroke).  To prevent the spread of illness:  Remember that washing with soap and clean, running water is the best way to prevent the spread of infection. Wash your hands before and after caring for a sick person. Dry your hands with a single-use disposable towel.  Clean the toilet after each use.  Wash your hands or use alcohol-based hand  before eating.  Wash your hands or use alcohol-based hand  before and after preparing food. Keep in mind that people with diarrhea or vomiting should not prepare food for others.  Wash your hands or use alcohol-based hand  after using cutting boards, counter-tops, and knives (and other utensils) that have been in contact with raw foods.  Wash and then peel fruits and vegetables.  Keep uncooked meats away from cooked and ready-to-eat foods.  Use a food thermometer when cooking. Cook poultry to at least 165°F (74°C). Cook ground meat (beef, veal, pork, lamb) to at least 160°F (71°C). Cook fresh beef, veal, lamb, and pork to at least 145°F (63°C).  Don’t eat raw or undercooked eggs (poached or miller side up), poultry, meat or unpasteurized milk and juices.  Food and drinks  The main goal while treating vomiting or diarrhea is to prevent dehydration. This is done by taking small amounts of liquids often.   Keep in mind that liquids are more important than food right now.  Drink only small amounts of liquids at a time.  Don’t force yourself to eat, especially if you are having cramping, vomiting, or diarrhea. Don’t eat large amounts at a time, even if you are hungry.  If you eat, avoid fatty, greasy, spicy, or fried foods.  Don’t eat dairy foods or drink milk if you have diarrhea. These can make diarrhea  worse.  The first 24 hours you can try:  Oral rehydration solutions, available at grocery stores and pharmacies. Sports drinks are not a good choice if you are very dehydrated. They have too much sugar and not enough electrolytes.  Soft drinks without caffeine  Ginger ale  Water (plain or flavored)  Decaf tea or coffee  Clear broth, consommé, or bouillon  Gelatin, ice pops, or frozen fruit juice bars   The second 24 hours, if you are feeling better, you can add:  Hot cereal, plain toast, bread, rolls, or crackers  Plain noodles, rice, mashed potatoes, chicken noodle soup, or rice soup  Unsweetened canned fruit (no pineapple)  Bananas  As you recover:  Limit fat intake to less than 15 grams per day. Don’t eat margarine, butter, oils, mayonnaise, sauces, gravies, fried foods, peanut butter, meat, poultry, or fish.  Limit fiber. Don’t eat raw or cooked vegetables, fresh fruits except bananas, and bran cereals.  Limit caffeine and chocolate.  Don’t use spices or seasonings except salt.  Resume a normal diet over time, as you feel better and your symptoms improve.  If the symptoms come back, go back to a simple diet or clear liquids.  Follow-up care  Follow up with your healthcare provider, or as advised. If a stool sample was taken or cultures were done, call the healthcare provider for the results as instructed.   Call 911  Call 911 if you have any of these symptoms:   Trouble breathing  Confusion  Extreme drowsiness or trouble walking  Loss of consciousness  Rapid heart rate  Chest pain  Stiff neck  Seizure  When to get medical advice  Call your healthcare provider right away if any of these occur:   Belly pain that gets worse  Constant lower right belly pain  Continued vomiting and inability to keep liquids down  Diarrhea more than 5 times a day  Blood in vomit or stool  Dark urine or no urine for 8 hours, dry mouth and tongue, tiredness, weakness, or dizziness  New rash  You don’t get better in 2 to 3 days  Fever of  100.4°F (38°C) or higher, or as advised by your provider  You have new symptoms of arthritis  [employer] last reviewed this educational content on 2/1/2022 © 2000-2023 [employer]. All rights reserved. This information is not intended as a substitute for professional medical care. Always follow your healthcare professional's instructions.

## 2024-02-13 NOTE — PROGRESS NOTES
Patient tolerated regular diet for lunch without any pain nausea vomiting or diarrhea. IV removed. Discharge AVS reviewed with patient. All questions answered. Work excuse note printed and given to patient. Bedside belongings packed up and returned to patient. Discharged home.

## 2024-02-13 NOTE — PLAN OF CARE
A/o x4. Ra/. Regular diet pt had one episode emesis after drinking Gatorade.  LBM 2/12. Denies pain . IVF infusing. Up independently. POC updated with pt and spouse at bedside. All safety measures in place. Call light within reach instructed pt to call for help or assistance.

## 2024-02-13 NOTE — PAYOR COMM NOTE
--------------  ADMISSION REVIEW     Payor: [employer]  Subscriber #:  [id number]  Authorization Number: [id number]    Admit date: N/A  Admit time: N/A      Observation   OP [id number]         REVIEW DOCUMENTATION:        Patient Seen in: [facility]      History     Chief Complaint   Patient presents with    Nausea/Vomiting/Diarrhea     Stated Complaint: nausea and vomiting for approximately a week.  denies fever    Subjective:   Patient is 38-year-old male presents emergency room for evaluation of epigastric pain.  Patient reports for the last week he has had nausea with forced vomiting no fevers.  Patient reports no tobacco use or alcohol use.  Patient reports he went to urgent care was given prescription for Zofran and Bentyl with no improvement.  Patient has no tenderness of patient on exam.    The history is provided by the patient.       Review of Systems   Constitutional:  Negative for fever.   Gastrointestinal:  Positive for abdominal pain, nausea and vomiting.       Positive for stated complaint: nausea and vomiting for approximately a week.  denies fever  Other systems are as noted in HPI.  Constitutional and vital signs reviewed.          ED Course     Labs Reviewed   COMP METABOLIC PANEL (14) - Abnormal; Notable for the following components:       Result Value    Glucose 101 (*)     All other components within normal limits   LIPASE - Abnormal; Notable for the following components:    Lipase 615 (*)     All other components within normal limits   CBC W/ DIFFERENTIAL - Abnormal; Notable for the following components:    WBC 18.3 (*)     Neutrophil Absolute Prelim 14.89 (*)     Neutrophil Absolute 14.89 (*)     Monocyte Absolute 1.42 (*)          Disposition and Plan     Clinical Impression:  1. Nausea vomiting and diarrhea    2. Epigastric pain    3. Acute pancreatitis, unspecified complication status, unspecified pancreatitis type         Disposition:  There  is no disposition on file for this visit.  There is no disposition time on file for this visit.          Medications Prescribed:  Current Discharge Medication List        START taking these medications    Details   ondansetron 4 MG Oral Tablet Dispersible Take 1 tablet (4 mg total) by mouth every 8 (eight) hours as needed for Nausea (vomiting).  Qty: 10 tablet, Refills: 0      Loperamide HCl 2 MG Oral Tab Take 1 tablet (2 mg total) by mouth as needed for Diarrhea. Max of 8mg in 24 hours  Qty: 20 tablet, Refills: 0               [facility] Hospitalist H&P       CC:   Chief Complaint   Patient presents with    Nausea/Vomiting/Diarrhea        PCP: Jarod Lo MD    History of Present Illness:    Patient is a relatively healthy 38-year-old male with no significant past medical history basically presented with abdominal knee pain x 1 week.  Patient states that for about 1 week he had midepigastric pain and he was doubled over with cramping as well as profuse diarrhea at home.  He also had some nausea especially with food but no vomiting.  No chest pains no shortness of breath review of system otherwise is complete negative.  Currently patient has a stomach ache but has no nausea no vomiting eager to eat something does not drink any alcohol.  Patient states that he probably had 1 drink in 6 months-no other symptoms whatsoever       in the emergency room his vitals were stable labs remarkable for lipase in the 600s, rest the workup was remarkable for leukocytosis of 18.3 UA was negative for C. difficile is negative        Patient was kept n.p.o. and admitted for acute pancreatitis CT abdomen pelvis was done that showed prominent fluid-filled small bowel loops with mild wall thickening concerning for some enteritis  PMH  History reviewed. No pertinent past medical history.     PSH  Past Surgical History:   Procedure Laterality Date    OTHER SURGICAL HISTORY  14 y/o    nasal fracture repair        ALL:  Allergies   Allergen  Reactions    Sulfa Antibiotics RASH        Home Medications:  Outpatient Medications Marked as Taking for the 2/10/24 encounter (Hospital Encounter)   Medication Sig Dispense Refill    ondansetron 4 MG Oral Tablet Dispersible Take 1 tablet (4 mg total) by mouth every 8 (eight) hours as needed for Nausea (vomiting). 10 tablet 0    Loperamide HCl 2 MG Oral Tab Take 1 tablet (2 mg total) by mouth as needed for Diarrhea. Max of 8mg in 24 hours 20 tablet 0         Soc Hx  Social History     Tobacco Use    Smoking status: Never    Smokeless tobacco: Never   Substance Use Topics    Alcohol use: Yes     Comment: socially        Fam Hx  Family History   Problem Relation Age of Onset    Cancer Maternal Grandmother 69        colon    Heart Attack Maternal Grandfather        Review of Systems  General: Denies unintentional weight loss, fevers, or chills negative except for above  HEENT: Denies vision loss or double vision, denies hearing loss  Cardiovascular: Denies chest pain, palpitations, peripheral edema  Pulmonary: Denies cough, shortness of breath, or wheezing  Gastrointestinal: Denies abdominal pain, melena, or hematochezia  Genitourinary: Denies urinary frequency, urgency, and dysuria  Neurologic: Denies numbness, headaches, focal weakness  Skin: Denies rashes, sores  Endocrine: Denies heat or cold intolerance, denies polydipsia  Hematologic: Denies abnormal bleeding or bruising     OBJECTIVE:  /65 (BP Location: Left arm)   Pulse 75   Temp 98.3 °F (36.8 °C) (Oral)   Resp 16   Ht 6' 6\" (1.981 m)   Wt 259 lb (117.5 kg)   SpO2 98%   BMI 29.93 kg/m²     BP Readings from Last 3 Encounters:   02/11/24 108/65   03/19/21 120/73   02/15/21 104/69       Radiology: CT ABDOMEN+PELVIS(CONTRAST ONLY)(CPT=74177)    Result Date: 2/11/2024  PROCEDURE:  CT ABDOMEN+PELVIS (CONTRAST ONLY) (CPT=74177)  COMPARISON:  None.  INDICATIONS:  nausea and vomiting for approximately a week.  denies fever  TECHNIQUE:  CT scanning was  performed from the dome of the diaphragm to the pubic symphysis with non-ionic intravenous contrast material. Post contrast coronal MPR imaging was performed.  Dose reduction techniques were used. Dose information is transmitted to the ACR (American College of Radiology) NRDR (National Radiology Data Registry) which includes the Dose Index Registry.  PATIENT STATED HISTORY:(As transcribed by Technologist)  nausea and vomiting for approximately a week.  denies fever   CONTRAST USED:  100cc of Isovue 370  FINDINGS:  LUNG BASE:  Clear. LIVER:  Homogeneous enhancement. BILIARY:  No biliary ductal dilatation. PANCREAS:  Homogeneous enhancement. SPLEEN:  Normal caliber. KIDNEYS:  No hydronephrosis or focal renal mass. ADRENALS:  Normal. AORTA/VASCULAR:  No aneurysm. RETROPERITONEUM:  No enlarged adenopathy. BOWEL/MESENTERY:  There is a large amount of stool throughout the colon consistent with constipation.  There are fluid-filled small bowel loops demonstrating mild wall thickening throughout the abdomen and anterior pelvis.  No evidence of mechanical small bowel obstruction.  Findings nonspecific although may be seen with enteritis. ABDOMINAL WALL:  Small fat containing umbilical hernia.. PELVIC ORGANS:  Normal for age. 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  Mild degenerative changes in the lower lumbar facets.             CONCLUSION:  There is prominent fluid-filled small bowel loops with mild wall thickening in the left upper quadrant, central abdomen and anterior pelvis.  This is nonspecific although may be seen with enteritis.  No evidence of mechanical small bowel obstruction.  Large amount of stool throughout the colon consistent with constipation.   LOCATION:  [id number]   Dictated by (CST): Mirna Reed MD on 2/11/2024 at 7:54 AM     Finalized by (CST): Mirna Reed MD on 2/11/2024 at 7:57 AM              ASSESSMENT / PLAN:       Patient is a relatively healthy 38-year-old male with no significant past  medical history basically presented with abdominal pain x 1 week.      # Acute pancreatitis unclear cause  -Elevated lipase, midepigastric pain with nausea vomiting  -CT abdomen pelvis without any evidence of pancreatitis however small fluid-filled bowels  -Getting a GI stool panel, LFTs are within normal limits, will get an right  upper quadrant ultrasound  -Lipid panel to rule out triglyceride induced pancreatitis  -Continue to monitor CBC, leukocytosis been trending down  -Clear liquid diet  -IV fluids, pain control nausea control    Pepito ac MD           2/12/24 Hospitalist    # Elevated lipase level  # Campylobacter and giardia gastroenteritis   - unlikely due to acute pancreatitis, more likely campylobacter and giardia gastroenteritis   - advance diet to FLD  - start azithromycin 500 mg po daily x 3 days  - start flagyl 500 mg po TID here, then tinidazole on discharge              Latest Reference Range & Units 02/10/24 22:57 02/11/24 11:08 02/12/24 05:14   Glucose 70 - 99 mg/dL 101 (H) 89 86   Sodium 136 - 145 mmol/L 140 141 140   Potassium 3.5 - 5.1 mmol/L 3.9 3.7 3.7   Chloride 98 - 112 mmol/L 108 111 111   Carbon Dioxide, Total 21.0 - 32.0 mmol/L 29.0 27.0 29.0   BUN 9 - 23 mg/dL 13 11 7 (L)   CREATININE 0.70 - 1.30 mg/dL 1.13 1.18 1.03   (H): Data is abnormally high  (L): Data is abnormally low   Latest Reference Range & Units 02/10/24 22:57 02/11/24 11:08 02/12/24 05:14   WBC 4.0 - 11.0 x10(3) uL 18.3 (H) 13.0 (H) 10.5   Hemoglobin 13.0 - 17.5 g/dL 15.0 15.3 14.6   Hematocrit 39.0 - 53.0 % 44.9 46.1 44.0   Platelet Count 150.0 - 450.0 10(3)uL 304.0 270.0 299.0   (H): Data is abnormally high    Campylobacter Pcr  Negative Positive Abnormal      I  MEDICATIONS ADMINISTERED IN LAST 1 DAY:  azithromycin (Zithromax) tab 500 mg       Date Action Dose Route User    2/13/2024 0807 Given 500 mg Oral Sonya Max, RN    2/12/2024 1431 Given 500 mg Oral Madeleine Moody, RN          lactated ringers  infusion       Date Action Dose Route User    2/13/2024 0803 New Bag (none) Intravenous Sonya Max, DEVEN    2/12/2024 2100 New Bag (none) Intravenous Aleida Medina RN    2/12/2024 1432 New Bag (none) Intravenous Madeleine Moody RN          metRONIDAZOLE (Flagyl) tab 500 mg       Date Action Dose Route User    2/13/2024 0529 Given 500 mg Oral Arianne Pinto RN    2/12/2024 2200 Given 500 mg Oral Aleida Medina RN    2/12/2024 1431 Given 500 mg Oral Madeleine Moody RN          prochlorperazine (Compazine) 10 MG/2ML injection 5 mg       Date Action Dose Route User    2/12/2024 2140 Given 5 mg Intravenous Aleida Medina RN            Vitals (last day)       Date/Time Temp Pulse Resp BP SpO2 Weight O2 Device O2 Flow Rate (L/min) [facility]    02/13/24 0520 98.2 °F (36.8 °C) 66 18 110/66 94 % -- -- -- RR    02/12/24 2010 98.3 °F (36.8 °C) 75 20 135/76 98 % -- None (Room air) -- RR    02/12/24 1625 98.2 °F (36.8 °C) 80 18 132/83 98 % -- -- --     02/12/24 0856 98.3 °F (36.8 °C) 73 19 131/76 98 % -- -- --     02/12/24 0445 98.1 °F (36.7 °C) 55 18 130/77 98 % -- None (Room air) -- RR

## 2024-02-13 NOTE — PROGRESS NOTES
AVS reviewed, IV to be dc'd prior to dc , will dc home once tolerating lunch , will dc on po abx- e-scribed to [facility].

## 2024-02-15 NOTE — PAYOR COMM NOTE
--------------  DISCHARGE REVIEW    Payor: JAKE SANCHEZ [facility] PLAN  Subscriber #:  [id number]  Authorization Number: [id number]    Admit date: N/A  Admit time:  N/A  Discharge Date: 2/13/2024  2:24 PM     Admitting Physician: Mehdi Talley MD  Attending Physician:  No att. providers found  Primary Care Physician: Jarod Lo MD                                                      [facility] Internal Medicine Hospitalist Discharge Summary     Patient ID:  Grant Hoyos  38 year old  5/18/1985    Admit date: 2/10/2024    Discharge date and time:  2/13    Attending Physician: Allan Smith DO     Primary Care Physician: Jarod Lo MD     Discharge Diagnoses:   Campylobacter enteritis   Giardia enteritis   Elevated lipase level     Please note that only [facility] and [facility] patients enrolled in the Medicare ACO, [facility] ACO and [facility] HMOs will be handled by the [facility] Care Management team.  For all other patients, please follow usual protocol for discharge care transition.    Discharge Condition: stable    Disposition:  Home    Important Follow up:  - PCP within 1 week  - Consults: None    Follow Up Items:  None      Hospital Course:      38-year-old male with no significant past medical history basically presented with abdominal pain x 1 week.     # Elevated lipase level  # Campylobacter and giardia gastroenteritis   - unlikely due to acute pancreatitis, more likely campylobacter and giardia gastroenteritis   - advance diet to reg diet  - start azithromycin 500 mg po daily x 3 days, finish course tomorrow   - start flagyl 500 mg po TID here, then tinidazole on discharge     Stable for discharge home.     Consults: IP CONSULT TO HOSPITALIST    Operative Procedures:  None      Patient instructions:      I as the attending physician reconciled the current and discharge medications on day of discharge.     Current Discharge Medication List        START taking these medications    Details   Tinidazole 500 MG  Oral Tab Take 4 tablets (2,000 mg total) by mouth once for 1 dose.      azithromycin 250 MG Oral Tab Take 2 tablets (500 mg total) by mouth once for 1 dose.      ondansetron 4 MG Oral Tablet Dispersible Take 1 tablet (4 mg total) by mouth every 8 (eight) hours as needed for Nausea (vomiting).      Loperamide HCl 2 MG Oral Tab Take 1 tablet (2 mg total) by mouth as needed for Diarrhea. Max of 8mg in 24 hours           STOP taking these medications       diazepam (VALIUM) 10 MG Oral Tab        traMADol HCl 50 MG Oral Tab              Activity: activity as tolerated  Diet: low fat, low cholesterol diet  Wound Care: as directed  Code Status: No Order      Exam on day of discharge:     Vitals:    02/13/24 0520   BP: 110/66   Pulse: 66   Resp: 18   Temp: 98.2 °F (36.8 °C)       General: No acute distress.   HEENT:  EOMI, PERRLA, OP clear  Respiratory: Clear to auscultation bilaterally. No wheezes. No rhonchi.  Cardiovascular: S1, S2. Regular rate and rhythm. No murmurs.  Abdomen: Soft, nontender, nondistended.  Positive bowel sounds. No rebound or guarding.  Extremities: No edema.  Neuro:  Grossly non focal, no motor deficits.        Total time coordinating care for discharge: Greater than 30 minutes    Allan Ryder [facility] and [facility] Hospitalist

## (undated) NOTE — LETTER
February 13, 2024  To Whom It May Concern,  Please let this letter certify that JESSIE PIÑA  was admitted to [facility] from 2/10/24-2/13/24 for an acute medical condition. He may return to:        [x]  Work - on 2/15/2024     []   School    []   P.E. Class   []  Sports     [x]  Without restrictions     []   With the following restrictions:      Additional Comments:         Please feel free to contact me at [phone] with any questions.   Sincerely,    Allan Smith DO  [facility] Hospitalist